# Patient Record
Sex: MALE | Race: WHITE | NOT HISPANIC OR LATINO | Employment: FULL TIME | URBAN - METROPOLITAN AREA
[De-identification: names, ages, dates, MRNs, and addresses within clinical notes are randomized per-mention and may not be internally consistent; named-entity substitution may affect disease eponyms.]

---

## 2023-03-13 LAB
ANION GAP IN SER/PLAS: 11 MMOL/L (ref 10–20)
CALCIUM (MG/DL) IN SER/PLAS: 9.7 MG/DL (ref 8.6–10.3)
CARBON DIOXIDE, TOTAL (MMOL/L) IN SER/PLAS: 28 MMOL/L (ref 21–32)
CHLORIDE (MMOL/L) IN SER/PLAS: 103 MMOL/L (ref 98–107)
CREATININE (MG/DL) IN SER/PLAS: 1.04 MG/DL (ref 0.5–1.3)
GFR MALE: 81 ML/MIN/1.73M2
GLUCOSE (MG/DL) IN SER/PLAS: 110 MG/DL (ref 74–99)
POTASSIUM (MMOL/L) IN SER/PLAS: 4.4 MMOL/L (ref 3.5–5.3)
SODIUM (MMOL/L) IN SER/PLAS: 138 MMOL/L (ref 136–145)
UREA NITROGEN (MG/DL) IN SER/PLAS: 24 MG/DL (ref 6–23)

## 2023-12-04 DIAGNOSIS — K21.00 GASTROESOPHAGEAL REFLUX DISEASE WITH ESOPHAGITIS WITHOUT HEMORRHAGE: Primary | ICD-10-CM

## 2023-12-04 RX ORDER — OMEPRAZOLE 20 MG/1
20 CAPSULE, DELAYED RELEASE ORAL DAILY
Qty: 90 CAPSULE | Refills: 3 | Status: SHIPPED | OUTPATIENT
Start: 2023-12-04 | End: 2023-12-19 | Stop reason: SDUPTHER

## 2023-12-11 DIAGNOSIS — I10 PRIMARY HYPERTENSION: Primary | ICD-10-CM

## 2023-12-11 RX ORDER — METOPROLOL TARTRATE 50 MG/1
25 TABLET ORAL 2 TIMES DAILY
Qty: 90 TABLET | Refills: 3 | Status: SHIPPED | OUTPATIENT
Start: 2023-12-11 | End: 2023-12-19 | Stop reason: SDUPTHER

## 2023-12-15 ASSESSMENT — PROMIS GLOBAL HEALTH SCALE
RATE_AVERAGE_PAIN: 1
CARRYOUT_SOCIAL_ACTIVITIES: VERY GOOD
RATE_PHYSICAL_HEALTH: GOOD
RATE_GENERAL_HEALTH: VERY GOOD
RATE_QUALITY_OF_LIFE: VERY GOOD
CARRYOUT_PHYSICAL_ACTIVITIES: COMPLETELY
RATE_MENTAL_HEALTH: GOOD
RATE_SOCIAL_SATISFACTION: GOOD
EMOTIONAL_PROBLEMS: SOMETIMES

## 2023-12-19 ENCOUNTER — OFFICE VISIT (OUTPATIENT)
Dept: PRIMARY CARE | Facility: CLINIC | Age: 63
End: 2023-12-19
Payer: COMMERCIAL

## 2023-12-19 VITALS
OXYGEN SATURATION: 96 % | RESPIRATION RATE: 16 BRPM | HEIGHT: 72 IN | SYSTOLIC BLOOD PRESSURE: 148 MMHG | WEIGHT: 227 LBS | BODY MASS INDEX: 30.75 KG/M2 | HEART RATE: 69 BPM | DIASTOLIC BLOOD PRESSURE: 82 MMHG | TEMPERATURE: 97.9 F

## 2023-12-19 DIAGNOSIS — N40.1 BENIGN PROSTATIC HYPERPLASIA WITH NOCTURIA: ICD-10-CM

## 2023-12-19 DIAGNOSIS — I10 PRIMARY HYPERTENSION: ICD-10-CM

## 2023-12-19 DIAGNOSIS — Z00.00 PHYSICAL EXAM, ANNUAL: Primary | ICD-10-CM

## 2023-12-19 DIAGNOSIS — K21.00 GASTROESOPHAGEAL REFLUX DISEASE WITH ESOPHAGITIS WITHOUT HEMORRHAGE: ICD-10-CM

## 2023-12-19 DIAGNOSIS — H10.33 ACUTE BACTERIAL CONJUNCTIVITIS OF BOTH EYES: ICD-10-CM

## 2023-12-19 DIAGNOSIS — J01.00 ACUTE NON-RECURRENT MAXILLARY SINUSITIS: ICD-10-CM

## 2023-12-19 DIAGNOSIS — M19.90 ARTHRITIS: ICD-10-CM

## 2023-12-19 DIAGNOSIS — R35.1 BENIGN PROSTATIC HYPERPLASIA WITH NOCTURIA: ICD-10-CM

## 2023-12-19 DIAGNOSIS — E55.9 VITAMIN D DEFICIENCY: ICD-10-CM

## 2023-12-19 LAB
25(OH)D3 SERPL-MCNC: 38 NG/ML (ref 30–100)
ALBUMIN SERPL BCP-MCNC: 4.8 G/DL (ref 3.4–5)
ALP SERPL-CCNC: 75 U/L (ref 33–136)
ALT SERPL W P-5'-P-CCNC: 23 U/L (ref 10–52)
ANION GAP SERPL CALC-SCNC: 15 MMOL/L (ref 10–20)
APPEARANCE UR: CLEAR
AST SERPL W P-5'-P-CCNC: 17 U/L (ref 9–39)
BILIRUB SERPL-MCNC: 0.5 MG/DL (ref 0–1.2)
BILIRUB UR QL STRIP: NEGATIVE
BUN SERPL-MCNC: 20 MG/DL (ref 6–23)
CALCIUM SERPL-MCNC: 9.9 MG/DL (ref 8.6–10.6)
CHLORIDE SERPL-SCNC: 104 MMOL/L (ref 98–107)
CHOLEST SERPL-MCNC: 205 MG/DL (ref 0–199)
CHOLESTEROL/HDL RATIO: 2.8
CO2 SERPL-SCNC: 25 MMOL/L (ref 21–32)
COLOR UR: YELLOW
CREAT SERPL-MCNC: 0.9 MG/DL (ref 0.5–1.3)
ERYTHROCYTE [DISTWIDTH] IN BLOOD BY AUTOMATED COUNT: 12.9 % (ref 11.5–14.5)
ERYTHROCYTE [SEDIMENTATION RATE] IN BLOOD BY WESTERGREN METHOD: 14 MM/H (ref 0–20)
GFR SERPL CREATININE-BSD FRML MDRD: >90 ML/MIN/1.73M*2
GLUCOSE SERPL-MCNC: 95 MG/DL (ref 74–99)
GLUCOSE UR STRIP-MCNC: NEGATIVE MG/DL
HCT VFR BLD AUTO: 46 % (ref 41–52)
HDLC SERPL-MCNC: 72.2 MG/DL
HGB BLD-MCNC: 15.1 G/DL (ref 13.5–17.5)
HGB UR QL STRIP: NEGATIVE
KETONES UR STRIP-MCNC: NEGATIVE MG/DL
LDLC SERPL CALC-MCNC: 110 MG/DL
LEUKOCYTE ESTERASE UR QL STRIP: NEGATIVE
MCH RBC QN AUTO: 30 PG (ref 26–34)
MCHC RBC AUTO-ENTMCNC: 32.8 G/DL (ref 32–36)
MCV RBC AUTO: 91 FL (ref 80–100)
NITRITE UR QL STRIP: NEGATIVE
NON HDL CHOLESTEROL: 133 MG/DL (ref 0–149)
NRBC BLD-RTO: 0 /100 WBCS (ref 0–0)
PH UR STRIP: 6 [PH]
PLATELET # BLD AUTO: 271 X10*3/UL (ref 150–450)
POTASSIUM SERPL-SCNC: 4.2 MMOL/L (ref 3.5–5.3)
PROT SERPL-MCNC: 7.3 G/DL (ref 6.4–8.2)
PROT UR STRIP-MCNC: NEGATIVE MG/DL
PSA SERPL-MCNC: 4.53 NG/ML
RBC # BLD AUTO: 5.04 X10*6/UL (ref 4.5–5.9)
RHEUMATOID FACT SER NEPH-ACNC: <10 IU/ML (ref 0–15)
SODIUM SERPL-SCNC: 140 MMOL/L (ref 136–145)
SP GR UR STRIP.AUTO: 1.02
TRIGL SERPL-MCNC: 116 MG/DL (ref 0–149)
TSH SERPL-ACNC: 2.03 MIU/L (ref 0.44–3.98)
URATE SERPL-MCNC: 6.6 MG/DL (ref 4–7.5)
UROBILINOGEN UR STRIP-ACNC: 0.2 E.U./DL
VLDL: 23 MG/DL (ref 0–40)
WBC # BLD AUTO: 7.9 X10*3/UL (ref 4.4–11.3)

## 2023-12-19 PROCEDURE — 80053 COMPREHEN METABOLIC PANEL: CPT

## 2023-12-19 PROCEDURE — 82306 VITAMIN D 25 HYDROXY: CPT

## 2023-12-19 PROCEDURE — 86038 ANTINUCLEAR ANTIBODIES: CPT

## 2023-12-19 PROCEDURE — 85652 RBC SED RATE AUTOMATED: CPT

## 2023-12-19 PROCEDURE — 86431 RHEUMATOID FACTOR QUANT: CPT

## 2023-12-19 PROCEDURE — 84550 ASSAY OF BLOOD/URIC ACID: CPT

## 2023-12-19 PROCEDURE — 93000 ELECTROCARDIOGRAM COMPLETE: CPT | Performed by: FAMILY MEDICINE

## 2023-12-19 PROCEDURE — 81003 URINALYSIS AUTO W/O SCOPE: CPT | Performed by: FAMILY MEDICINE

## 2023-12-19 PROCEDURE — 84153 ASSAY OF PSA TOTAL: CPT

## 2023-12-19 PROCEDURE — 1036F TOBACCO NON-USER: CPT | Performed by: FAMILY MEDICINE

## 2023-12-19 PROCEDURE — 80061 LIPID PANEL: CPT

## 2023-12-19 PROCEDURE — 36415 COLL VENOUS BLD VENIPUNCTURE: CPT

## 2023-12-19 PROCEDURE — 84443 ASSAY THYROID STIM HORMONE: CPT

## 2023-12-19 PROCEDURE — 85027 COMPLETE CBC AUTOMATED: CPT

## 2023-12-19 PROCEDURE — 3079F DIAST BP 80-89 MM HG: CPT | Performed by: FAMILY MEDICINE

## 2023-12-19 PROCEDURE — 3077F SYST BP >= 140 MM HG: CPT | Performed by: FAMILY MEDICINE

## 2023-12-19 PROCEDURE — 99396 PREV VISIT EST AGE 40-64: CPT | Performed by: FAMILY MEDICINE

## 2023-12-19 RX ORDER — TAMSULOSIN HYDROCHLORIDE 0.4 MG/1
0.4 CAPSULE ORAL DAILY
Qty: 90 CAPSULE | Refills: 3 | Status: SHIPPED | OUTPATIENT
Start: 2023-12-19 | End: 2024-12-18

## 2023-12-19 RX ORDER — NIFEDIPINE 60 MG/1
60 TABLET, EXTENDED RELEASE ORAL DAILY
Qty: 90 TABLET | Refills: 3 | Status: SHIPPED | OUTPATIENT
Start: 2023-12-19

## 2023-12-19 RX ORDER — TELMISARTAN 80 MG/1
1 TABLET ORAL DAILY
COMMUNITY
Start: 2019-09-18 | End: 2023-12-19 | Stop reason: SDUPTHER

## 2023-12-19 RX ORDER — NIFEDIPINE 60 MG/1
TABLET, EXTENDED RELEASE ORAL
COMMUNITY
Start: 2019-10-16 | End: 2023-12-19 | Stop reason: SDUPTHER

## 2023-12-19 RX ORDER — TELMISARTAN 80 MG/1
80 TABLET ORAL DAILY
Qty: 90 TABLET | Refills: 3 | Status: SHIPPED | OUTPATIENT
Start: 2023-12-19

## 2023-12-19 RX ORDER — AZITHROMYCIN 250 MG/1
TABLET, FILM COATED ORAL
Qty: 6 TABLET | Refills: 0 | Status: SHIPPED | OUTPATIENT
Start: 2023-12-19 | End: 2023-12-24

## 2023-12-19 RX ORDER — CRANBERRY FRUIT EXTRACT 650 MG
CAPSULE ORAL
COMMUNITY

## 2023-12-19 RX ORDER — CLOTRIMAZOLE AND BETAMETHASONE DIPROPIONATE 10; .64 MG/G; MG/G
1 CREAM TOPICAL 2 TIMES DAILY
COMMUNITY
Start: 2015-09-25

## 2023-12-19 RX ORDER — METOPROLOL TARTRATE 50 MG/1
25 TABLET ORAL 2 TIMES DAILY
Qty: 90 TABLET | Refills: 3 | Status: SHIPPED | OUTPATIENT
Start: 2023-12-19

## 2023-12-19 RX ORDER — OMEPRAZOLE 20 MG/1
20 CAPSULE, DELAYED RELEASE ORAL DAILY
Qty: 90 CAPSULE | Refills: 3 | Status: SHIPPED | OUTPATIENT
Start: 2023-12-19

## 2023-12-19 RX ORDER — GENTAMICIN SULFATE 3 MG/ML
1-2 SOLUTION/ DROPS OPHTHALMIC EVERY 4 HOURS
Qty: 5 ML | Refills: 0 | Status: SHIPPED | OUTPATIENT
Start: 2023-12-19 | End: 2023-12-27

## 2023-12-19 ASSESSMENT — PAIN SCALES - GENERAL: PAINLEVEL: 0-NO PAIN

## 2023-12-19 ASSESSMENT — PATIENT HEALTH QUESTIONNAIRE - PHQ9
2. FEELING DOWN, DEPRESSED OR HOPELESS: NOT AT ALL
SUM OF ALL RESPONSES TO PHQ9 QUESTIONS 1 AND 2: 0
1. LITTLE INTEREST OR PLEASURE IN DOING THINGS: NOT AT ALL

## 2023-12-19 NOTE — PROGRESS NOTES
Subjective   Steve Castro is a 63 y.o. male who presents for Annual Exam (patient here for annual physical exam today).    HPI  : Patient is a 63-year-old male who is in for his yearly physical exam.  Patient will give us a urine specimen, he will have an EKG and complete blood work.  He is also worried about arthritis in his joints and he will have an arthritic panel.  Patient works second shift for the city [employer] and also stays very busy in the summertime doing his [employer] business.  Patient takes medication for hypertension, does have some issues with BPH and nocturia, he has seen urology and had a prostate biopsy in the past.  Patient also is currently having some issues with a sinus infection and conjunctivitis which she thinks he picked up from his grandchildren.      Objective  : ROS :10 systems were reviewed and the information is included in the HPI and no additional review of systems is indicated.    Physical Exam  Vitals and nursing note reviewed.   Constitutional:       Appearance: Normal appearance.      Comments: Patient is alert and oriented x3.   No acute distress   HENT:      Head: Normocephalic.      Right Ear: Tympanic membrane and external ear normal.      Left Ear: Tympanic membrane and external ear normal.      Ears:      Comments: Ears are patent bilaterally and TMs are clear.     Nose: Congestion present.      Mouth/Throat:      Mouth: Mucous membranes are moist.      Pharynx: Oropharynx is clear.      Comments: Mouth is moist, tongue is midline.  No posterior pharyngeal erythema.  Eyes:      General:         Right eye: Discharge present.      Extraocular Movements: Extraocular movements intact.      Conjunctiva/sclera: Conjunctivae normal.      Pupils: Pupils are equal, round, and reactive to light.      Comments: Conjunctivitis both eyes.   Neck:      Comments: No carotid bruits, no thyromegaly, no cervical adenopathy.  Occasional neck spasm and restriction of motion secondary to  stress and tension.  Cardiovascular:      Rate and Rhythm: Normal rate and regular rhythm.      Pulses: Normal pulses.      Heart sounds: Normal heart sounds.      Comments: Patient denies chest pain and no palpitations.  Heart rhythm is stable S1 and S2 are noted, no ectopics.  Pulmonary:      Effort: Pulmonary effort is normal.      Comments: Patient denies any coughing or wheezing.  Lungs are clear to auscultation.    Abdominal:      General: Bowel sounds are normal.      Palpations: Abdomen is soft.      Comments: Abdomen is soft and nontender, no hepatosplenomegaly.  No flank tenderness.  No suprapubic pain.  Positive bowel sounds x4.  Colonoscopy  8 years ago.  Recheck in 2 years.   Genitourinary:     Comments: Patient denies dysuria, no hematuria,  denies flank pain.  Patient does get nocturia.  Saw Urology and had biopsy done for BPH, and biopsy was negative.  Also had MRI scan and ok.  Musculoskeletal:         General: Tenderness present. Normal range of motion.      Cervical back: Normal range of motion.      Comments: Patient has some aches and pains in his low back, hips and knees.  He would like an arthritic profile panel to check for arthritis in the blood.  Age-related arthritis in the joints.  Mild restriction of motion cervical and lumbar spines due to mild arthritis and muscle spasm.  Developing a contracture in the left palm of the hand.  Also has a trigger finger left fourth digit.   Skin:     General: Skin is warm and dry.      Comments: Patient is developing a Dupuytren's contracture of the left hand and will be sent to hand surgery and orthopedics.  He also has a trigger finger in the left hand fourth digit.   Neurological:      General: No focal deficit present.      Mental Status: He is alert and oriented to person, place, and time. Mental status is at baseline.      Comments: No focal neurosensory deficits are noted.  Patient denies any peripheral neuropathy.  Coordination and gait are  stable.  Normal muscle strength upper and lower extremities.   Psychiatric:         Behavior: Behavior normal.         Thought Content: Thought content normal.         Judgment: Judgment normal.      Comments: Patient does have anxiety about his health problems including his hypertension.  He  has normal mood and affect.  Thought content and judgment are stable.  No signs of vascular dementia.  Behavior is normal.     PLAN : Patient is a 63-year-old male who is in for a physical exam.  His urinalysis showed a pH of 6.0, specific gravity 1.020, negative leukocytes, negative protein, negative blood, negative glucose.  ECG showed sinus bradycardia at a rate of 56, no ST-T wave changes, normal ECG.  Patient also had his blood work drawn and will be notified of these results and 3 days.  Further recommendations will be made at that time.  He also has some concerns about arthritis and we did draw a arthritis panel which she will be notified about.  Patient was also treated for sinusitis and conjunctivitis.  His other medications for hypertension were sent to mail order.  Further recommendations will be made after review of his  blood results and he will follow-up as needed.    Problem List Items Addressed This Visit    None  Visit Diagnoses       Arthritis    -  Primary    Relevant Orders    Arthritis Panel (CMS)    Physical exam, annual        Relevant Orders    CBC    Comprehensive Metabolic Panel    Lipid Panel    Prostate Specific Antigen, Screen    Thyroid Stimulating Hormone    POCT UA (Automated) docked device    ECG 12 Lead    Vitamin D deficiency        Relevant Orders    Vitamin D 25-Hydroxy,Total (for eval of Vitamin D levels)                 Zane Robertson, DO

## 2023-12-20 LAB — ANA SER QL HEP2 SUBST: NEGATIVE

## 2023-12-21 NOTE — RESULT ENCOUNTER NOTE
Cholesterol is just slightly borderline at 205     bad cholesterol was 110 and should be under 100      triglycerides are normal at 116        PSA level was borderline at 4.53      under 4 is normal        he can talk to the urologist about that but it might improve with the tamsulosin he is now taking    the arthritic panel was normal    no signs of arthritis in the blood.  Vitamin D was normal at 38      thyroid function was normal     blood sugar, kidney and liver function are normal     Red and white blood cell counts are normal     overall blood work looks stable just watch the cholesterol   and recheck the prostate level     in 6 months to a year since he already had the prostate biopsy which was negative.

## 2024-01-08 DIAGNOSIS — M19.90 ARTHRITIS: Primary | ICD-10-CM

## 2024-01-08 RX ORDER — IBUPROFEN 800 MG/1
800 TABLET ORAL 2 TIMES DAILY PRN
Qty: 180 TABLET | Refills: 3 | Status: SHIPPED | OUTPATIENT
Start: 2024-01-08

## 2024-01-30 ENCOUNTER — OFFICE VISIT (OUTPATIENT)
Dept: UROLOGY | Facility: HOSPITAL | Age: 64
End: 2024-01-30
Payer: COMMERCIAL

## 2024-01-30 DIAGNOSIS — N40.1 BENIGN PROSTATIC HYPERPLASIA WITH NOCTURIA: ICD-10-CM

## 2024-01-30 DIAGNOSIS — R35.1 BENIGN PROSTATIC HYPERPLASIA WITH NOCTURIA: ICD-10-CM

## 2024-01-30 DIAGNOSIS — R97.20 ELEVATED PSA: ICD-10-CM

## 2024-01-30 DIAGNOSIS — R31.0 GROSS HEMATURIA: Primary | ICD-10-CM

## 2024-01-30 PROCEDURE — 1036F TOBACCO NON-USER: CPT | Performed by: UROLOGY

## 2024-01-30 PROCEDURE — 99214 OFFICE O/P EST MOD 30 MIN: CPT | Performed by: UROLOGY

## 2024-01-30 PROCEDURE — 51798 US URINE CAPACITY MEASURE: CPT | Performed by: UROLOGY

## 2024-01-30 PROCEDURE — 99214 OFFICE O/P EST MOD 30 MIN: CPT | Mod: 27 | Performed by: UROLOGY

## 2024-01-30 NOTE — PROGRESS NOTES
FUV    Last seen - 3/22/23     HISTORY OF PRESENT ILLNESS:   Steve Castro is a 63 y.o. male who is being seen today for fuv.      H/o elevated PSA.  Had prostate biopsy with Dr. Longoria, all cores benign.  Most recent PSA 4.53    On flomax BID.      H/o hematuria  Urine cytology - no atypia  CTU - 70g prostate, no upper tract pathology  Refused cystoscopy    Most recent UA no blood or infection    Continues to have intermittent blood in urine 1 day every 10m or so    PAST MEDICAL HISTORY:  Past Medical History:   Diagnosis Date    Arthropathy, unspecified     Arthropathy of multiple sites    Benign prostatic hyperplasia without lower urinary tract symptoms 09/25/2015    BPH (benign prostatic hyperplasia)    Other microscopic hematuria     Microscopic hematuria    Personal history of other diseases of the digestive system     History of esophageal reflux     PAST SURGICAL HISTORY:  Past Surgical History:   Procedure Laterality Date    EYE SURGERY  06/26/2015    Eye Surgery    TONSILLECTOMY  06/26/2015    Tonsillectomy      ALLERGIES:   No Known Allergies     MEDICATIONS:   Current Outpatient Medications   Medication Instructions    clotrimazole-betamethasone (Lotrisone) cream 1 Film, Topical, 2 times daily    D3-E-Se-soy ddbuz-utnedb-sxhob (Prostate 2.4) 1,200-15-35 unit-unit-mcg capsule     ibuprofen 800 mg, oral, 2 times daily PRN    metoprolol tartrate (LOPRESSOR) 25 mg, oral, 2 times daily    NIFEdipine ER (ADALAT CC) 60 mg, oral, Daily    omeprazole (PRILOSEC) 20 mg, oral, Daily    tamsulosin (FLOMAX) 0.4 mg, oral, Daily    telmisartan (MICARDIS) 80 mg, oral, Daily      PHYSICAL EXAM:  There were no vitals taken for this visit.  Constitutional: Patient appears well-developed and well-nourished. No distress.    Pulmonary/Chest: Effort normal. No respiratory distress.   Abdominal: Soft, ND NT  Musculoskeletal: Normal range of motion.    Neurological: Alert and oriented to person, place, and time.  Psychiatric:  Normal mood and affect. Behavior is normal. Thought content normal.      Labs  Lab Results   Component Value Date    PSA 3.11 08/05/2019     Lab Results   Component Value Date    GFRMALE 81 03/13/2023     Lab Results   Component Value Date    CREATININE 0.90 12/19/2023     Lab Results   Component Value Date    CHOL 205 (H) 12/19/2023     Lab Results   Component Value Date    HDL 72.2 12/19/2023     Lab Results   Component Value Date    CHHDL 2.8 12/19/2023     Lab Results   Component Value Date    LDLF 132 (H) 12/13/2022     Lab Results   Component Value Date    VLDL 23 12/19/2023     Lab Results   Component Value Date    TRIG 116 12/19/2023     Lab Results   Component Value Date    HCT 46.0 12/19/2023       Imaging    Procedures    PVR 0    Assessment:      1. Gross hematuria        2. Benign prostatic hyperplasia with nocturia        3. Elevated PSA            Steve Castro is a 63 y.o. male here for FUV     Plan:   1)  Will schedule cystoscopy to complete hematuria workup    2) PVR 0, continue flomax    Lower Urinary Tract Symptoms (LUTS)    I educated the patient on relevant lower urinary tract anatomy and physiology with emphasis on differential diagnosis as well as contributing factors to the patient's lower urinary tract symptoms. I educated the patient on dietary and behavioral modifications pertinent to the patient's complaints. I recommended to avoid caffeine, alcohol, spicy and acidic oral intake and to regulate fluid intake and voiding (timed voiding, double voiding). I stressed the importance of avoiding constipation and recommended stool softeners unless diarrhea present. We discussed limiting fluid intake at night and elevating legs prior to bedtime.     The mechanism of action as well as the risks, benefits, common side effects, and alternatives to all prescribed medications were discussed with the patient at length. The patient had the opportunity to ask questions and all questions were answered. I  primarily discussed alpha blockers, 5ARIs, PDE5i, anticholinergics, and beta 3 agonist (mirabegron).  I explained that alpha blockers help decrease bladder outlet resistance with potential side effects to include retrograde ejaculation, rhinitis, and light headedness. I explained that 5 alpha reductase inhibitors can shrink the prostate up to 30%, can artificially decrease their PSA value by 50%, but take approximately 6-9 months to reach full efficacy and have potential side effects to include decreased libido, impotence and breast tenderness.      3) Elevated PSA  -Negtive prior biopsy  -Will get MRI prostate    Elevated PSA  Patient presents today for the evaluation of elevated PSA (Prostate Specific Antigen).  We discussed Prostate cancer screening, and that it is recommended for men aged 55-69, but can also start early in high risk patients ( and patients with family history of prostate cancer) and go longer in active, healthy men.  We discussed the screening process involves a digital rectal exam (ROSANNA) and blood test (PSA).      We discussed the role of trans-rectal ultrasound guided prostate biopsy.  I highlighted that it is an office based procedure.  He will be given a dose of antibiotics prior to the procedure.  He was also instructed to give himself an enema the morning of the biopsy.  Risks of the biopsy including pain, blood in the urine, stool and ejaculate which can last a few weeks.  The risk of post prostate biopsy sepsis was discussed and that if signs of infection, such as fevers, chills, lethargy require a prompt evaluation.      I highlighted the role of MRI Prostate in identifying high-risk prostate cancer and images can be used to target those areas with the biopsy.

## 2024-02-11 ENCOUNTER — HOSPITAL ENCOUNTER (OUTPATIENT)
Dept: RADIOLOGY | Facility: HOSPITAL | Age: 64
Discharge: HOME | End: 2024-02-11

## 2024-02-11 DIAGNOSIS — R97.20 ELEVATED PSA: ICD-10-CM

## 2024-02-11 PROCEDURE — 6100000002 MR PROSTATE SCREENING SELF PAY EXAM

## 2024-02-26 ENCOUNTER — PROCEDURE VISIT (OUTPATIENT)
Dept: UROLOGY | Facility: HOSPITAL | Age: 64
End: 2024-02-26
Payer: COMMERCIAL

## 2024-02-26 VITALS — DIASTOLIC BLOOD PRESSURE: 88 MMHG | SYSTOLIC BLOOD PRESSURE: 171 MMHG | HEART RATE: 74 BPM

## 2024-02-26 DIAGNOSIS — N40.1 BENIGN PROSTATIC HYPERPLASIA WITH NOCTURIA: ICD-10-CM

## 2024-02-26 DIAGNOSIS — R97.20 ELEVATED PSA: ICD-10-CM

## 2024-02-26 DIAGNOSIS — R31.0 GROSS HEMATURIA: Primary | ICD-10-CM

## 2024-02-26 DIAGNOSIS — R35.1 BENIGN PROSTATIC HYPERPLASIA WITH NOCTURIA: ICD-10-CM

## 2024-02-26 PROCEDURE — 99214 OFFICE O/P EST MOD 30 MIN: CPT | Performed by: UROLOGY

## 2024-02-26 PROCEDURE — 52000 CYSTOURETHROSCOPY: CPT | Performed by: UROLOGY

## 2024-02-26 ASSESSMENT — PAIN SCALES - GENERAL: PAINLEVEL: 0-NO PAIN

## 2024-02-26 NOTE — PROGRESS NOTES
FUV    Last seen - 1/30/24     HISTORY OF PRESENT ILLNESS:   Steve Castro is a 63 y.o. male who is being seen today for fuv.      H/o elevated PSA.  Had prostate biopsy with Dr. Longoria, all cores benign.  Most recent PSA 4.53. MRI prostate 2/11/24 - BPH changes in TZ, PIRADS 2    On flomax BID.      H/o hematuria  Urine cytology - no atypia  CTU - 70g prostate, no upper tract pathology      PAST MEDICAL HISTORY:  Past Medical History:   Diagnosis Date    Arthropathy, unspecified     Arthropathy of multiple sites    Benign prostatic hyperplasia without lower urinary tract symptoms 09/25/2015    BPH (benign prostatic hyperplasia)    Other microscopic hematuria     Microscopic hematuria    Personal history of other diseases of the digestive system     History of esophageal reflux     PAST SURGICAL HISTORY:  Past Surgical History:   Procedure Laterality Date    EYE SURGERY  06/26/2015    Eye Surgery    TONSILLECTOMY  06/26/2015    Tonsillectomy      ALLERGIES:   No Known Allergies     MEDICATIONS:   Current Outpatient Medications   Medication Instructions    clotrimazole-betamethasone (Lotrisone) cream 1 Film, Topical, 2 times daily    D3-E-Se-soy esldw-tuhvcp-mjctc (Prostate 2.4) 1,200-15-35 unit-unit-mcg capsule     ibuprofen 800 mg, oral, 2 times daily PRN    metoprolol tartrate (LOPRESSOR) 25 mg, oral, 2 times daily    NIFEdipine ER (ADALAT CC) 60 mg, oral, Daily    omeprazole (PRILOSEC) 20 mg, oral, Daily    tamsulosin (FLOMAX) 0.4 mg, oral, Daily    telmisartan (MICARDIS) 80 mg, oral, Daily      PHYSICAL EXAM:  Blood pressure 171/88, pulse 74.  Constitutional: Patient appears well-developed and well-nourished. No distress.    Pulmonary/Chest: Effort normal. No respiratory distress.   Abdominal: Soft, ND NT  Musculoskeletal: Normal range of motion.    Neurological: Alert and oriented to person, place, and time.  Psychiatric: Normal mood and affect. Behavior is normal. Thought content normal.      Labs  Lab Results    Component Value Date    PSA 3.11 08/05/2019     Lab Results   Component Value Date    GFRMALE 81 03/13/2023     Lab Results   Component Value Date    CREATININE 0.90 12/19/2023     Lab Results   Component Value Date    CHOL 205 (H) 12/19/2023     Lab Results   Component Value Date    HDL 72.2 12/19/2023     Lab Results   Component Value Date    CHHDL 2.8 12/19/2023     Lab Results   Component Value Date    LDLF 132 (H) 12/13/2022     Lab Results   Component Value Date    VLDL 23 12/19/2023     Lab Results   Component Value Date    TRIG 116 12/19/2023     Lab Results   Component Value Date    HCT 46.0 12/19/2023     Procedure:  After informed consent was obtained, the patient was taken to the procedure room for cystoscopy due to hematuria.     Cystoscopy     Procedure Note:    A sterile prep and drape was performed in standard fashion. Lidocaine was used for topical anesthesia. A flexible cystoscope was inserted into the urethra without difficulty revealing normal urethra.     The prostate enlarged bilateral lateral lobes, no intravesicular median lobe     Then entered the bladder revealing bladder mucosa with no erythematous patches or plaques, foreign bodies, stones or papillary lesions. The ureteral orifices were visualized bilaterally. These were orthotopic in location and effluxing clear urine. No masses were seen on retroflexion.     Post-Procedure:   The cystoscope was removed. The vital signs were stable . The patient tolerated the procedure well. There were no complications.      Assessment:      1. Gross hematuria        2. Elevated PSA        3. Benign prostatic hyperplasia with nocturia          Steve Castro is a 63 y.o. male here for FUV     Plan:   1)  Negative hematuria workup    2) Cont flomax    Lower Urinary Tract Symptoms (LUTS)    I educated the patient on relevant lower urinary tract anatomy and physiology with emphasis on differential diagnosis as well as contributing factors to the  patient's lower urinary tract symptoms. I educated the patient on dietary and behavioral modifications pertinent to the patient's complaints. I recommended to avoid caffeine, alcohol, spicy and acidic oral intake and to regulate fluid intake and voiding (timed voiding, double voiding). I stressed the importance of avoiding constipation and recommended stool softeners unless diarrhea present. We discussed limiting fluid intake at night and elevating legs prior to bedtime.     The mechanism of action as well as the risks, benefits, common side effects, and alternatives to all prescribed medications were discussed with the patient at length. The patient had the opportunity to ask questions and all questions were answered. I primarily discussed alpha blockers, 5ARIs, PDE5i, anticholinergics, and beta 3 agonist (mirabegron).  I explained that alpha blockers help decrease bladder outlet resistance with potential side effects to include retrograde ejaculation, rhinitis, and light headedness. I explained that 5 alpha reductase inhibitors can shrink the prostate up to 30%, can artificially decrease their PSA value by 50%, but take approximately 6-9 months to reach full efficacy and have potential side effects to include decreased libido, impotence and breast tenderness.      3) Elevated PSA  -Negtive prior biopsy  -MRI reassuring    Elevated PSA  Patient presents today for the evaluation of elevated PSA (Prostate Specific Antigen).  We discussed Prostate cancer screening, and that it is recommended for men aged 55-69, but can also start early in high risk patients ( and patients with family history of prostate cancer) and go longer in active, healthy men.  We discussed the screening process involves a digital rectal exam (ROSANNA) and blood test (PSA).      We discussed the role of trans-rectal ultrasound guided prostate biopsy.  I highlighted that it is an office based procedure.  He will be given a dose of  antibiotics prior to the procedure.  He was also instructed to give himself an enema the morning of the biopsy.  Risks of the biopsy including pain, blood in the urine, stool and ejaculate which can last a few weeks.  The risk of post prostate biopsy sepsis was discussed and that if signs of infection, such as fevers, chills, lethargy require a prompt evaluation.      I highlighted the role of MRI Prostate in identifying high-risk prostate cancer and images can be used to target those areas with the biopsy.      FU in 6m, sooner if needed

## 2024-08-27 ENCOUNTER — OFFICE VISIT (OUTPATIENT)
Dept: UROLOGY | Facility: HOSPITAL | Age: 64
End: 2024-08-27
Payer: COMMERCIAL

## 2024-08-27 DIAGNOSIS — R97.20 ELEVATED PSA: ICD-10-CM

## 2024-08-27 DIAGNOSIS — N40.1 BENIGN PROSTATIC HYPERPLASIA WITH NOCTURIA: Primary | ICD-10-CM

## 2024-08-27 DIAGNOSIS — R35.1 BENIGN PROSTATIC HYPERPLASIA WITH NOCTURIA: Primary | ICD-10-CM

## 2024-08-27 LAB
POC APPEARANCE, URINE: CLEAR
POC BILIRUBIN, URINE: NEGATIVE
POC BLOOD, URINE: NEGATIVE
POC COLOR, URINE: YELLOW
POC GLUCOSE, URINE: NEGATIVE MG/DL
POC KETONES, URINE: NEGATIVE MG/DL
POC LEUKOCYTES, URINE: NEGATIVE
POC NITRITE,URINE: NEGATIVE
POC PH, URINE: 6.5 PH
POC PROTEIN, URINE: NEGATIVE MG/DL
POC SPECIFIC GRAVITY, URINE: 1.02
POC UROBILINOGEN, URINE: 0.2 EU/DL

## 2024-08-27 PROCEDURE — 99213 OFFICE O/P EST LOW 20 MIN: CPT | Performed by: UROLOGY

## 2024-08-27 PROCEDURE — G2211 COMPLEX E/M VISIT ADD ON: HCPCS | Performed by: UROLOGY

## 2024-08-27 PROCEDURE — 51798 US URINE CAPACITY MEASURE: CPT | Performed by: UROLOGY

## 2024-08-27 PROCEDURE — 81003 URINALYSIS AUTO W/O SCOPE: CPT | Mod: QW | Performed by: UROLOGY

## 2024-08-27 NOTE — PROGRESS NOTES
FUV    Last seen - 2/26/24     HISTORY OF PRESENT ILLNESS:   Steve Castro is a 63 y.o. male who is being seen today for fuv.      H/o elevated PSA.  Had prostate biopsy with Dr. Longoria, all cores benign.  Most recent PSA 4.53. MRI prostate 2/11/24 - BPH changes in TZ, PIRADS 2    On flomax 0.8mg daily      H/o hematuria  Urine cytology - no atypia  CTU - 70g prostate, no upper tract pathology  Cystoscopy - enlarged prostate, no masses or abnormalities    8/27/24 - Been doing well, no hematuria.  Ok stream      PAST MEDICAL HISTORY:  Past Medical History:   Diagnosis Date    Arthropathy, unspecified     Arthropathy of multiple sites    Benign prostatic hyperplasia without lower urinary tract symptoms 09/25/2015    BPH (benign prostatic hyperplasia)    Other microscopic hematuria     Microscopic hematuria    Personal history of other diseases of the digestive system     History of esophageal reflux     PAST SURGICAL HISTORY:  Past Surgical History:   Procedure Laterality Date    EYE SURGERY  06/26/2015    Eye Surgery    TONSILLECTOMY  06/26/2015    Tonsillectomy      ALLERGIES:   No Known Allergies     MEDICATIONS:   Current Outpatient Medications   Medication Instructions    clotrimazole-betamethasone (Lotrisone) cream 1 Film, Topical, 2 times daily    D3-E-Se-soy hxbio-zzreqa-abctl (Prostate 2.4) 1,200-15-35 unit-unit-mcg capsule     ibuprofen 800 mg, oral, 2 times daily PRN    metoprolol tartrate (LOPRESSOR) 25 mg, oral, 2 times daily    NIFEdipine ER (ADALAT CC) 60 mg, oral, Daily    omeprazole (PRILOSEC) 20 mg, oral, Daily    tamsulosin (FLOMAX) 0.4 mg, oral, Daily    telmisartan (MICARDIS) 80 mg, oral, Daily      PHYSICAL EXAM:  There were no vitals taken for this visit.  Constitutional: Patient appears well-developed and well-nourished. No distress.    Pulmonary/Chest: Effort normal. No respiratory distress.   Abdominal: Soft, ND NT  Musculoskeletal: Normal range of motion.    Neurological: Alert and oriented  to person, place, and time.  Psychiatric: Normal mood and affect. Behavior is normal. Thought content normal.      Labs  Lab Results   Component Value Date    PSA 3.11 08/05/2019     Lab Results   Component Value Date    GFRMALE 81 03/13/2023     Lab Results   Component Value Date    CREATININE 0.90 12/19/2023     Lab Results   Component Value Date    CHOL 205 (H) 12/19/2023     Lab Results   Component Value Date    HDL 72.2 12/19/2023     Lab Results   Component Value Date    CHHDL 2.8 12/19/2023     Lab Results   Component Value Date    LDLF 132 (H) 12/13/2022     Lab Results   Component Value Date    VLDL 23 12/19/2023     Lab Results   Component Value Date    TRIG 116 12/19/2023     Lab Results   Component Value Date    HCT 46.0 12/19/2023     UA negative for blood or LE  PVR 11cc    Assessment:      1. Benign prostatic hyperplasia with nocturia  Measure post void residual    POCT UA Automated manually resulted      2. Elevated PSA          Steve Castro is a 63 y.o. male here for FUV     Plan:   Cont flomax    Lower Urinary Tract Symptoms (LUTS)    I educated the patient on relevant lower urinary tract anatomy and physiology with emphasis on differential diagnosis as well as contributing factors to the patient's lower urinary tract symptoms. I educated the patient on dietary and behavioral modifications pertinent to the patient's complaints. I recommended to avoid caffeine, alcohol, spicy and acidic oral intake and to regulate fluid intake and voiding (timed voiding, double voiding). I stressed the importance of avoiding constipation and recommended stool softeners unless diarrhea present. We discussed limiting fluid intake at night and elevating legs prior to bedtime.     The mechanism of action as well as the risks, benefits, common side effects, and alternatives to all prescribed medications were discussed with the patient at length. The patient had the opportunity to ask questions and all questions were  answered. I primarily discussed alpha blockers, 5ARIs, PDE5i, anticholinergics, and beta 3 agonist (mirabegron).  I explained that alpha blockers help decrease bladder outlet resistance with potential side effects to include retrograde ejaculation, rhinitis, and light headedness. I explained that 5 alpha reductase inhibitors can shrink the prostate up to 30%, can artificially decrease their PSA value by 50%, but take approximately 6-9 months to reach full efficacy and have potential side effects to include decreased libido, impotence and breast tenderness.      3) Elevated PSA  -Negative prior biopsy  -MRI reassuring  -PSA this winter with PCP    Elevated PSA  Patient presents today for the evaluation of elevated PSA (Prostate Specific Antigen).  We discussed Prostate cancer screening, and that it is recommended for men aged 55-69, but can also start early in high risk patients ( and patients with family history of prostate cancer) and go longer in active, healthy men.  We discussed the screening process involves a digital rectal exam (ROSANNA) and blood test (PSA).      We discussed the role of trans-rectal ultrasound guided prostate biopsy.  I highlighted that it is an office based procedure.  He will be given a dose of antibiotics prior to the procedure.  He was also instructed to give himself an enema the morning of the biopsy.  Risks of the biopsy including pain, blood in the urine, stool and ejaculate which can last a few weeks.  The risk of post prostate biopsy sepsis was discussed and that if signs of infection, such as fevers, chills, lethargy require a prompt evaluation.      I highlighted the role of MRI Prostate in identifying high-risk prostate cancer and images can be used to target those areas with the biopsy.

## 2024-11-27 DIAGNOSIS — N40.1 BENIGN PROSTATIC HYPERPLASIA WITH NOCTURIA: ICD-10-CM

## 2024-11-27 DIAGNOSIS — R35.1 BENIGN PROSTATIC HYPERPLASIA WITH NOCTURIA: ICD-10-CM

## 2024-11-27 RX ORDER — TAMSULOSIN HYDROCHLORIDE 0.4 MG/1
0.4 CAPSULE ORAL DAILY
Qty: 90 CAPSULE | Refills: 3 | Status: SHIPPED | OUTPATIENT
Start: 2024-11-27

## 2024-12-13 DIAGNOSIS — I10 PRIMARY HYPERTENSION: ICD-10-CM

## 2024-12-13 RX ORDER — NIFEDIPINE 60 MG/1
60 TABLET, EXTENDED RELEASE ORAL DAILY
Qty: 90 TABLET | Refills: 3 | Status: SHIPPED | OUTPATIENT
Start: 2024-12-13

## 2024-12-13 RX ORDER — TELMISARTAN 80 MG/1
80 TABLET ORAL DAILY
Qty: 90 TABLET | Refills: 3 | Status: SHIPPED | OUTPATIENT
Start: 2024-12-13

## 2024-12-14 DIAGNOSIS — B37.2 YEAST DERMATITIS: Primary | ICD-10-CM

## 2024-12-16 RX ORDER — CLOTRIMAZOLE 1 %
CREAM (GRAM) TOPICAL 2 TIMES DAILY
Qty: 45 G | Refills: 2 | Status: SHIPPED | OUTPATIENT
Start: 2024-12-16

## 2024-12-23 ASSESSMENT — PROMIS GLOBAL HEALTH SCALE
RATE_SOCIAL_SATISFACTION: VERY GOOD
RATE_PHYSICAL_HEALTH: GOOD
RATE_GENERAL_HEALTH: GOOD
CARRYOUT_PHYSICAL_ACTIVITIES: COMPLETELY
RATE_AVERAGE_PAIN: 2
CARRYOUT_SOCIAL_ACTIVITIES: GOOD
EMOTIONAL_PROBLEMS: RARELY
RATE_QUALITY_OF_LIFE: VERY GOOD
RATE_MENTAL_HEALTH: GOOD

## 2024-12-30 ENCOUNTER — APPOINTMENT (OUTPATIENT)
Dept: PRIMARY CARE | Facility: CLINIC | Age: 64
End: 2024-12-30
Payer: COMMERCIAL

## 2024-12-30 VITALS
WEIGHT: 231 LBS | DIASTOLIC BLOOD PRESSURE: 88 MMHG | BODY MASS INDEX: 31.33 KG/M2 | RESPIRATION RATE: 18 BRPM | HEART RATE: 62 BPM | OXYGEN SATURATION: 96 % | SYSTOLIC BLOOD PRESSURE: 155 MMHG

## 2024-12-30 DIAGNOSIS — M19.90 ARTHRITIS: ICD-10-CM

## 2024-12-30 DIAGNOSIS — Z00.00 PHYSICAL EXAM, ANNUAL: Primary | ICD-10-CM

## 2024-12-30 DIAGNOSIS — I10 PRIMARY HYPERTENSION: ICD-10-CM

## 2024-12-30 DIAGNOSIS — R97.20 ELEVATED PSA: ICD-10-CM

## 2024-12-30 DIAGNOSIS — E55.9 VITAMIN D DEFICIENCY: ICD-10-CM

## 2024-12-30 LAB
25(OH)D3 SERPL-MCNC: 43 NG/ML (ref 30–100)
ALBUMIN SERPL BCP-MCNC: 4.4 G/DL (ref 3.4–5)
ALP SERPL-CCNC: 72 U/L (ref 33–136)
ALT SERPL W P-5'-P-CCNC: 24 U/L (ref 10–52)
ANION GAP SERPL CALC-SCNC: 14 MMOL/L (ref 10–20)
APPEARANCE UR: CLEAR
AST SERPL W P-5'-P-CCNC: 18 U/L (ref 9–39)
BILIRUB SERPL-MCNC: 0.5 MG/DL (ref 0–1.2)
BILIRUB UR QL STRIP: NEGATIVE
BUN SERPL-MCNC: 21 MG/DL (ref 6–23)
CALCIUM SERPL-MCNC: 9.8 MG/DL (ref 8.6–10.6)
CHLORIDE SERPL-SCNC: 105 MMOL/L (ref 98–107)
CHOLEST SERPL-MCNC: 206 MG/DL (ref 0–199)
CHOLESTEROL/HDL RATIO: 2.8
CO2 SERPL-SCNC: 29 MMOL/L (ref 21–32)
COLOR UR: YELLOW
CREAT SERPL-MCNC: 0.88 MG/DL (ref 0.5–1.3)
EGFRCR SERPLBLD CKD-EPI 2021: >90 ML/MIN/1.73M*2
ERYTHROCYTE [DISTWIDTH] IN BLOOD BY AUTOMATED COUNT: 12.8 % (ref 11.5–14.5)
GLUCOSE SERPL-MCNC: 105 MG/DL (ref 74–99)
GLUCOSE UR STRIP-MCNC: NEGATIVE MG/DL
HCT VFR BLD AUTO: 45.6 % (ref 41–52)
HDLC SERPL-MCNC: 74.7 MG/DL
HGB BLD-MCNC: 14.7 G/DL (ref 13.5–17.5)
HGB UR QL STRIP: NEGATIVE
KETONES UR STRIP-MCNC: NEGATIVE MG/DL
LDLC SERPL CALC-MCNC: 118 MG/DL
LEUKOCYTE ESTERASE UR QL STRIP: NEGATIVE
MCH RBC QN AUTO: 29.8 PG (ref 26–34)
MCHC RBC AUTO-ENTMCNC: 32.2 G/DL (ref 32–36)
MCV RBC AUTO: 93 FL (ref 80–100)
NITRITE UR QL STRIP: NEGATIVE
NON HDL CHOLESTEROL: 131 MG/DL (ref 0–149)
NRBC BLD-RTO: 0 /100 WBCS (ref 0–0)
PH UR STRIP: 5.5 [PH]
PLATELET # BLD AUTO: 299 X10*3/UL (ref 150–450)
POTASSIUM SERPL-SCNC: 5.3 MMOL/L (ref 3.5–5.3)
PROT SERPL-MCNC: 7 G/DL (ref 6.4–8.2)
PROT UR STRIP-MCNC: NEGATIVE MG/DL
PSA SERPL-MCNC: 4.68 NG/ML
RBC # BLD AUTO: 4.93 X10*6/UL (ref 4.5–5.9)
SODIUM SERPL-SCNC: 143 MMOL/L (ref 136–145)
SP GR UR STRIP.AUTO: 1.02
TRIGL SERPL-MCNC: 67 MG/DL (ref 0–149)
TSH SERPL-ACNC: 1.91 MIU/L (ref 0.44–3.98)
UROBILINOGEN UR STRIP-ACNC: 0.2 E.U./DL
VLDL: 13 MG/DL (ref 0–40)
WBC # BLD AUTO: 7 X10*3/UL (ref 4.4–11.3)

## 2024-12-30 PROCEDURE — 93000 ELECTROCARDIOGRAM COMPLETE: CPT | Performed by: FAMILY MEDICINE

## 2024-12-30 PROCEDURE — 82306 VITAMIN D 25 HYDROXY: CPT

## 2024-12-30 PROCEDURE — 80053 COMPREHEN METABOLIC PANEL: CPT

## 2024-12-30 PROCEDURE — 3079F DIAST BP 80-89 MM HG: CPT | Performed by: FAMILY MEDICINE

## 2024-12-30 PROCEDURE — 85027 COMPLETE CBC AUTOMATED: CPT

## 2024-12-30 PROCEDURE — 1036F TOBACCO NON-USER: CPT | Performed by: FAMILY MEDICINE

## 2024-12-30 PROCEDURE — 3077F SYST BP >= 140 MM HG: CPT | Performed by: FAMILY MEDICINE

## 2024-12-30 PROCEDURE — 84443 ASSAY THYROID STIM HORMONE: CPT

## 2024-12-30 PROCEDURE — 99396 PREV VISIT EST AGE 40-64: CPT | Performed by: FAMILY MEDICINE

## 2024-12-30 PROCEDURE — 84153 ASSAY OF PSA TOTAL: CPT

## 2024-12-30 PROCEDURE — 81003 URINALYSIS AUTO W/O SCOPE: CPT | Performed by: FAMILY MEDICINE

## 2024-12-30 PROCEDURE — 80061 LIPID PANEL: CPT

## 2024-12-30 NOTE — PROGRESS NOTES
Subjective   Steve Castro is a 64 y.o. male who presents for Annual Exam.    HPI  : Patient is a 64-year-old male who is in for his yearly physical exam.  He will have an ECG done, complete blood work and a urine specimen.  He does have hypertension and takes medication as directed.  He recently followed up with urology due to some hematuria and also elevated PSA level.  Patient also had a recent cystoscopy and an MRI scan of his prostate.  He states he has been feeling well recently and has no   acute or new medical problems.  He also will need a referral for a colonoscopy, since it has been 5 years since his previous colonoscopy.    Objective  : ROS : 10 systems were reviewed and the information is included in the HPI and no additional review of systems is indicated.    Physical Exam  Vitals and nursing note reviewed.   Constitutional:       Appearance: Normal appearance. He is obese.      Comments: Patient is alert and oriented x3.   No acute distress   and tries to watch his diet since he does not want to get diabetes.   HENT:      Head: Normocephalic.      Right Ear: Tympanic membrane and external ear normal.      Left Ear: Tympanic membrane and external ear normal.      Ears:      Comments: Ears are patent bilaterally and TMs are clear.     Nose: Nose normal.      Mouth/Throat:      Mouth: Mucous membranes are moist.      Pharynx: Oropharynx is clear.      Comments: Mouth is moist, tongue is midline.  No posterior pharyngeal erythema.  Eyes:      Extraocular Movements: Extraocular movements intact.      Conjunctiva/sclera: Conjunctivae normal.      Pupils: Pupils are equal, round, and reactive to light.      Comments: No visual disturbance, does have his  eyes examined once a year.   Neck:      Comments: No carotid bruits, no thyromegaly, no cervical adenopathy.  Occasional neck spasm and restriction of motion secondary to stress and tension.  Cardiovascular:      Rate and Rhythm: Normal rate and regular  rhythm.      Pulses: Normal pulses.      Heart sounds: Normal heart sounds.      Comments: Patient denies chest pain and no palpitations.  Heart rhythm is stable S1 and S2 are noted, no ectopics.  Pulmonary:      Effort: Pulmonary effort is normal.      Breath sounds: Normal breath sounds.      Comments: Patient is getting over a cold and upper respiratory infection and there are few scattered rhonchi to auscultation.  No wheezing.  No shortness of breath.  Abdominal:      General: Bowel sounds are normal.      Palpations: Abdomen is soft.      Comments: Abdomen is soft and mildly obese but non tender.  Needs  Colonoscopy  in 2025.  Patient was given a referral for his colonoscopy.   No flank tenderness.  No suprapubic pain.  Positive bowel sounds.  No abdominal guarding and no rebound tenderness.   Genitourinary:     Comments: Patient follow with Urology and had hematuria' and a cystoscopy completed.  He also had an MRI scan of his prostate.  Musculoskeletal:         General: Tenderness present. Normal range of motion.      Cervical back: Normal range of motion and neck supple.      Comments: Patient has a part-time [employer] business in the summer and does complain of shoulder aches and pains and some low back discomfort .  Osteoarthritis shoulders and knees.  Age-related arthritis in the joints.  Restriction of motion cervical and lumbar spines due to arthritis, and muscle spasm.   Skin:     General: Skin is warm.      Comments: There is no bruising, no erythema, no skin lesions noted, no rashes.   Neurological:      General: No focal deficit present.      Mental Status: He is alert and oriented to person, place, and time. Mental status is at baseline.      Comments: No focal neurosensory deficits are noted.  Patient denies any peripheral neuropathy.  Coordination and gait are stable.  Normal muscle strength upper and lower extremities.   Psychiatric:         Mood and Affect: Mood normal.         Behavior:  Behavior normal.         Thought Content: Thought content normal.         Judgment: Judgment normal.      Comments: Patient has anxious mood and affect.  Patient does worry about health issues.  Thought content and judgment are stable.  No signs of vascular dementia.  Behavior is normal.     PLAN : Patient is a 64-year-old male who was evaluated for his yearly physical exam.  ECG showed sinus rhythm at a rate of 62 bpm, no acute ST-T wave changes, normal ECG.  Urinalysis showed a pH of 5.5, specific gravity 1.020, negative for leukocytes, negative blood, negative protein, negative glucose.  Patient had complete blood work drawn and will be notified of the results in 3 days.  Further recommendations will be made at that time.  He has some age-related arthritis in his shoulders hips and knees.  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  Patient is otherwise doing well and vitals are stable and he will follow-up as needed pending his blood work results.    Problem List Items Addressed This Visit       Physical exam, annual    Relevant Orders    CBC    Comprehensive Metabolic Panel    Lipid Panel    Thyroid Stimulating Hormone    POCT UA (Automated) docked device    ECG 12 Lead    Prostate Specific Antigen, Screen     Other Visit Diagnoses       Vitamin D deficiency        Relevant Orders    Vitamin D 25-Hydroxy,Total (for eval of Vitamin D levels)                 Zane Robertson,

## 2024-12-31 DIAGNOSIS — M19.90 ARTHRITIS: ICD-10-CM

## 2024-12-31 RX ORDER — IBUPROFEN 800 MG/1
800 TABLET ORAL 2 TIMES DAILY PRN
Qty: 180 TABLET | Refills: 3 | Status: SHIPPED | OUTPATIENT
Start: 2024-12-31

## 2024-12-31 NOTE — RESULT ENCOUNTER NOTE
Blood sugar was just very slightly borderline at 105  ,   not bad.   Kidney and liver function are normal.       Cholesterol slightly borderline at 206 and should be under 200,   last year it was 205  .   Triglycerides are normal at 67    PSA was borderline at 4.68 and last year it was 4.53   ,    under 4 is normal.      Red and white blood cell counts are normal.    Vitamin D is normal at 43      thyroid function is normal          just try to watch the diet a little bit better and lower the cholesterol     and talk to urology about the PSA    but other blood work is normal and stable

## 2025-02-25 DIAGNOSIS — K21.00 GASTROESOPHAGEAL REFLUX DISEASE WITH ESOPHAGITIS WITHOUT HEMORRHAGE: ICD-10-CM

## 2025-02-25 RX ORDER — OMEPRAZOLE 20 MG/1
20 CAPSULE, DELAYED RELEASE ORAL DAILY
Qty: 90 CAPSULE | Refills: 3 | Status: SHIPPED | OUTPATIENT
Start: 2025-02-25

## 2025-03-04 DIAGNOSIS — I10 PRIMARY HYPERTENSION: ICD-10-CM

## 2025-03-04 RX ORDER — METOPROLOL TARTRATE 50 MG/1
25 TABLET ORAL 2 TIMES DAILY
Qty: 90 TABLET | Refills: 3 | Status: SHIPPED | OUTPATIENT
Start: 2025-03-04

## 2025-05-05 DIAGNOSIS — I10 PRIMARY HYPERTENSION: ICD-10-CM

## 2025-05-05 RX ORDER — NIFEDIPINE 60 MG/1
60 TABLET, FILM COATED, EXTENDED RELEASE ORAL 2 TIMES DAILY
Qty: 180 TABLET | Refills: 3 | Status: SHIPPED | OUTPATIENT
Start: 2025-05-05

## 2025-05-05 RX ORDER — NIFEDIPINE 60 MG/1
60 TABLET, FILM COATED, EXTENDED RELEASE ORAL 2 TIMES DAILY
Qty: 60 TABLET | Refills: 0 | Status: SHIPPED | OUTPATIENT
Start: 2025-05-05 | End: 2025-06-04

## 2025-08-28 ENCOUNTER — OFFICE VISIT (OUTPATIENT)
Dept: UROLOGY | Facility: HOSPITAL | Age: 65
End: 2025-08-28
Payer: COMMERCIAL

## 2025-08-28 DIAGNOSIS — R97.20 ELEVATED PSA: Primary | ICD-10-CM

## 2025-08-28 DIAGNOSIS — R31.0 GROSS HEMATURIA: ICD-10-CM

## 2025-08-28 DIAGNOSIS — R39.9 LOWER URINARY TRACT SYMPTOMS (LUTS): ICD-10-CM

## 2025-08-28 LAB
POC APPEARANCE, URINE: CLEAR
POC BILIRUBIN, URINE: NEGATIVE
POC BLOOD, URINE: NEGATIVE
POC COLOR, URINE: YELLOW
POC GLUCOSE, URINE: NEGATIVE MG/DL
POC KETONES, URINE: NEGATIVE MG/DL
POC LEUKOCYTES, URINE: NEGATIVE
POC NITRITE,URINE: NEGATIVE
POC PH, URINE: 6 PH
POC PROTEIN, URINE: NEGATIVE MG/DL
POC SPECIFIC GRAVITY, URINE: 1.02
POC UROBILINOGEN, URINE: 0.2 EU/DL

## 2025-08-28 PROCEDURE — 51798 US URINE CAPACITY MEASURE: CPT | Performed by: UROLOGY

## 2025-08-28 PROCEDURE — 99214 OFFICE O/P EST MOD 30 MIN: CPT | Performed by: UROLOGY

## 2025-08-28 PROCEDURE — 99212 OFFICE O/P EST SF 10 MIN: CPT

## 2025-08-28 PROCEDURE — 81003 URINALYSIS AUTO W/O SCOPE: CPT | Mod: QW | Performed by: UROLOGY

## 2026-09-01 ENCOUNTER — APPOINTMENT (OUTPATIENT)
Dept: UROLOGY | Facility: HOSPITAL | Age: 66
End: 2026-09-01
Payer: COMMERCIAL